# Patient Record
Sex: FEMALE | Race: OTHER | HISPANIC OR LATINO | Employment: FULL TIME | URBAN - METROPOLITAN AREA
[De-identification: names, ages, dates, MRNs, and addresses within clinical notes are randomized per-mention and may not be internally consistent; named-entity substitution may affect disease eponyms.]

---

## 2022-03-18 ENCOUNTER — GYNECOLOGY VISIT (OUTPATIENT)
Dept: OBGYN | Facility: CLINIC | Age: 27
End: 2022-03-18

## 2022-03-18 VITALS — DIASTOLIC BLOOD PRESSURE: 62 MMHG | SYSTOLIC BLOOD PRESSURE: 116 MMHG | WEIGHT: 164 LBS

## 2022-03-18 DIAGNOSIS — N91.2 AMENORRHEA: ICD-10-CM

## 2022-03-18 DIAGNOSIS — K21.9 GASTROESOPHAGEAL REFLUX DISEASE, UNSPECIFIED WHETHER ESOPHAGITIS PRESENT: ICD-10-CM

## 2022-03-18 PROCEDURE — 99203 OFFICE O/P NEW LOW 30 MIN: CPT | Mod: 25 | Performed by: OBSTETRICS & GYNECOLOGY

## 2022-03-18 PROCEDURE — 76705 ECHO EXAM OF ABDOMEN: CPT | Mod: TC | Performed by: OBSTETRICS & GYNECOLOGY

## 2022-03-18 RX ORDER — FAMOTIDINE 40 MG/1
40 TABLET, FILM COATED ORAL DAILY
Qty: 30 TABLET | Refills: 6 | Status: SHIPPED | OUTPATIENT
Start: 2022-03-18

## 2022-03-18 NOTE — PROGRESS NOTES
CC: Missed menses    Yamileth Bernabe is a 26 y.o.  who presents presents due to missed menses. Patient's last menstrual period was 2021 (exact date).She was not using anything for birthcontrol.  This is a  planned and desired pregnancy.   Reports she has been feeling okay thus far in pregnancy. She denies nausea/vomiting, denies headache, denies dysuria, denies  vaginal bleeding/spotting, and denies contractions/cramping.  Having bad acid reflux.   Partner: Jonathan    Review of systems:  Pertinent positives documented in HPI and all other systems reviewed & are negative    GYN History:  Patient's last menstrual period was 2021 (exact date).Menarche @14.  Menses regular. Last pap- thinks it was when she had her IUD removed in ,  no h/o abnormal pap.  Pap done at [facility] and [facility] services in [place]. No history of cone biopsy, LEEP or any other cervical, uterine or gynecologic surgery. No history of sexually transmitted diseases.  Currently sexually active with her partner.      OB History:    OB History    Para Term  AB Living   3 2 1 1   2   SAB IAB Ectopic Molar Multiple Live Births             2      # Outcome Date GA Lbr Yossi/2nd Weight Sex Delivery Anes PTL Lv   3 Current            2 Term 18 40w0d  3.969 kg (8 lb 12 oz) M Vag-Spont  N LINNETTE   1  / 36w0d  3.204 kg (7 lb 1 oz) M Vag-Spont  N LINNETTE       All PMH, PSH, allergies, social history and FH reviewed and updated today:  Past Medical History:  Past Medical History:   Diagnosis Date   • Anemia    • GERD (gastroesophageal reflux disease)        Past Surgical History:  History reviewed. No pertinent surgical history.    Medications:   Current Outpatient Medications Ordered in Epic   Medication Sig Dispense Refill   • Prenatal MV-Min-Fe Fum-FA-DHA (PRENATAL 1 PO) Take  by mouth.     • famotidine (PEPCID) 40 MG Tab Take 1 Tablet by mouth every day. 30 Tablet 6     No current Epic-ordered facility-administered  medications on file.       Allergies: Patient has no known allergies.    Social History:  Social History     Socioeconomic History   • Marital status:    Tobacco Use   • Smoking status: Never Smoker   • Smokeless tobacco: Never Used   Vaping Use   • Vaping Use: Never used   Substance and Sexual Activity   • Alcohol use: Not Currently   • Drug use: Never   • Sexual activity: Yes     Partners: Male     Comment: None        Family History:  Family History   Problem Relation Age of Onset   • No Known Problems Mother    • No Known Problems Father    • No Known Problems Sister    • No Known Problems Brother    • No Known Problems Sister            Objective:   Vitals:  /62   Wt 74.4 kg (164 lb)   There is no height or weight on file to calculate BMI. (Goal BM I>18 <25)  Exam:  General: appears stated age, is in no apparent distress, is well developed and well nourished  Head: normocephalic, non-tender  Neck: neck is supple, no thyromegaly  Abdomen: Bowel sounds positive, nondistended, soft, nontender x4, no rebound or guarding. No organomegaly. No masses.   Skin: No rashes, or ulcers or lesions seen  Psychiatric: appropriate affect, alert and oriented x3, intact judgment and insight.    Procedure:  Abdominal US performed by me and per my read:    Indication: amenorrhea.     Findings: navarro intrauterine pregnancy @ 16w2d by CRL. Positive yolk sac. Positive fetal cardiac activity @ 140 BPM. Right ovary not seen. Left Ovary not seen. Cervical length grossly normal. No free fluid in the cul-de-sac.    Impression: viable IUP @ 16w2d.  EVANGELIST by US of 22.    No results found for this or any previous visit (from the past 336 hour(s)).   Pregnancy exam/test positive    A/P:   26 y.o.  here for   1. Gastroesophageal reflux disease, unspecified whether esophagitis present  famotidine (PEPCID) 40 MG Tab   2. Amenorrhea         #Missed menses - amenorrhea due to pregnancy.  US today is c/w LMP. EVANGELIST of  9/4/22.  Discussed pregnancy with patient who is happy.    --Normal pregnancy s/s discussed  --Advised prenatal vitamins, healthy well rounded diet, adequate hydration, and continued exercise.    #Cervical cancer screening.  Last pap thinks last year.    #Will order prenatal labs and any additional imaging/testing needed at new OB visit  #SAB precautions discussed.  #Discussed the size of our practice and that she will see multiple providers during the course of her care. She expresses understanding.   #Follow up in 2-4 weeks for new OB visit.    30 minutes were spent in face-to-face patient contact with patient, greater than 50% of which was was spent in counseling and coordination of care for her newly diagnosed pregnancy including medical and surgical options of care.    MATTHIAS

## 2022-03-18 NOTE — NON-PROVIDER
Pt here for DUB.    Pt states she is experiencing really bad heartburn   Good# [phone]  LMP: 11/28/21, 15w5d today.   Pharmacy confirmed

## 2022-03-22 ENCOUNTER — APPOINTMENT (OUTPATIENT)
Dept: OBGYN | Facility: CLINIC | Age: 27
End: 2022-03-22

## 2022-05-10 ENCOUNTER — TELEPHONE (OUTPATIENT)
Dept: OBGYN | Facility: CLINIC | Age: 27
End: 2022-05-10

## 2022-05-10 NOTE — TELEPHONE ENCOUNTER
[facility] called stating patient is being admitted at their hospital and they would like records faxed over to their hospital so that they do not have to do a complete work up.     Informed [facility] before any records are sent over. A records release form is required.     They understood and stated they will fax over records release.

## 2022-07-15 PROBLEM — Z87.51 HISTORY OF PRETERM LABOR: Status: ACTIVE | Noted: 2022-07-15
